# Patient Record
Sex: FEMALE | Race: WHITE | NOT HISPANIC OR LATINO | Employment: OTHER | URBAN - NONMETROPOLITAN AREA
[De-identification: names, ages, dates, MRNs, and addresses within clinical notes are randomized per-mention and may not be internally consistent; named-entity substitution may affect disease eponyms.]

---

## 2020-01-07 NOTE — PROGRESS NOTES
Patient: Alex Moyer    YOB: 1950    Date: 01/10/2020    Primary Care Provider: Provider, No Known    Chief Complaint   Patient presents with   • Mass     Posterior neck mass        SUBJECTIVE:    History of present illness:  I saw the patient in the office today for the evaluation and treatment of a posterior neck mass.  The patient states that she first noticed a small marble size knot on the back of her neck about 20 years ago.  She states that this has increased in size over the years.  She has recently started noticing some stinging and discomfort when moving her neck or raising her arms.  Mass is now become considerably larger, greater than 5 cm and mobile.    The following portions of the patient's history were reviewed and updated as appropriate: allergies, current medications, past family history, past medical history, past social history, past surgical history and problem list.      Review of Systems   Constitutional: Negative for chills, fever and unexpected weight change.   HENT: Negative for hearing loss, trouble swallowing and voice change.    Eyes: Negative for visual disturbance.   Respiratory: Negative for apnea, cough, chest tightness, shortness of breath and wheezing.    Cardiovascular: Negative for chest pain, palpitations and leg swelling.   Gastrointestinal: Negative for abdominal distention, abdominal pain, anal bleeding, blood in stool, constipation, diarrhea, nausea, rectal pain and vomiting.   Endocrine: Negative for cold intolerance and heat intolerance.   Genitourinary: Negative for difficulty urinating, dysuria and flank pain.   Musculoskeletal: Negative for back pain and gait problem.   Skin: Negative for color change, rash and wound.   Neurological: Negative for dizziness, syncope, speech difficulty, weakness, light-headedness, numbness and headaches.   Hematological: Negative for adenopathy. Does not bruise/bleed easily.   Psychiatric/Behavioral: Negative for  "confusion. The patient is not nervous/anxious.        Allergies:  No Known Allergies    Medications:    Current Outpatient Medications:   •  hydroCHLOROthiazide (HYDRODIURIL) 50 MG tablet, Take 50 mg by mouth Daily., Disp: , Rfl:     History:  History reviewed. No pertinent past medical history.    Past Surgical History:   Procedure Laterality Date   • NO PAST SURGERIES         Family History   Problem Relation Age of Onset   • No Known Problems Mother    • No Known Problems Father    • Hypertension Sister        Social History     Tobacco Use   • Smoking status: Never Smoker   • Smokeless tobacco: Never Used   Substance Use Topics   • Alcohol use: No     Frequency: Never   • Drug use: Defer        OBJECTIVE:    Vital Signs:   Vitals:    01/10/20 1446   BP: 110/70   Pulse: 62   Temp: 98.6 °F (37 °C)   TempSrc: Temporal   SpO2: 97%   Weight: 84.4 kg (186 lb)   Height: 163.8 cm (64.5\")       Physical Exam:   General Appearance:    Alert, cooperative, in no acute distress   Head:    Normocephalic, without obvious abnormality, atraumatic   Eyes:            Lids and lashes normal, conjunctivae and sclerae normal, no   icterus, no pallor, corneas clear, PERRLA   Ears:    Ears appear intact with no abnormalities noted   Throat:   No oral lesions, no thrush, oral mucosa moist   Neck:   No adenopathy, supple, trachea midline, no thyromegaly, no   carotid bruit, no JVD.  5 to 6 cm lipomatous mass left posterior neck.  Mobile and well-circumscribed.   Lungs:     Clear to auscultation,respirations regular, even and                  unlabored    Heart:    Regular rhythm and normal rate, normal S1 and S2, no            murmur, no gallop, no rub, no click   Chest Wall:    No abnormalities observed   Abdomen:     Normal bowel sounds, no masses, no organomegaly, soft        non-tender, non-distended, no guarding, no rebound                tenderness   Extremities:   Moves all extremities well, no edema, no cyanosis, no             " redness   Pulses:   Pulses palpable and equal bilaterally   Skin:   No bleeding, bruising or rash   Lymph nodes:   No palpable adenopathy   Neurologic:   Cranial nerves 2 - 12 grossly intact, sensation intact, DTR       present and equal bilaterally     Results Review:   I reviewed the patient's new clinical results.    Review of Systems was reviewed and confirmed as accurate as documented by the MA.    ASSESSMENT/PLAN:    1. Lipoma of neck        Patient scheduled for excision of neck mass.  Risk of bleeding infection recurrence discussed and patient agreeable.    I discussed the patients findings and my recommendations with patient        Electronically signed by Marie Watts MD  01/10/20      .    Portions of this note have been scribed for Marie Watts MD by Katie Rodriguez 1/10/2020  3:14 PM

## 2020-01-10 ENCOUNTER — OFFICE VISIT (OUTPATIENT)
Dept: SURGERY | Facility: CLINIC | Age: 70
End: 2020-01-10

## 2020-01-10 VITALS
TEMPERATURE: 98.6 F | WEIGHT: 186 LBS | BODY MASS INDEX: 30.99 KG/M2 | SYSTOLIC BLOOD PRESSURE: 110 MMHG | DIASTOLIC BLOOD PRESSURE: 70 MMHG | HEIGHT: 65 IN | HEART RATE: 62 BPM | OXYGEN SATURATION: 97 %

## 2020-01-10 DIAGNOSIS — D17.0 LIPOMA OF NECK: Primary | ICD-10-CM

## 2020-01-10 PROCEDURE — 99203 OFFICE O/P NEW LOW 30 MIN: CPT | Performed by: SURGERY

## 2020-01-10 RX ORDER — HYDROCHLOROTHIAZIDE 50 MG/1
50 TABLET ORAL DAILY
COMMUNITY

## 2020-01-20 ENCOUNTER — PROCEDURE VISIT (OUTPATIENT)
Dept: SURGERY | Facility: CLINIC | Age: 70
End: 2020-01-20

## 2020-01-20 VITALS
OXYGEN SATURATION: 97 % | WEIGHT: 186 LBS | SYSTOLIC BLOOD PRESSURE: 124 MMHG | HEART RATE: 69 BPM | BODY MASS INDEX: 43.05 KG/M2 | TEMPERATURE: 97.8 F | HEIGHT: 55 IN | DIASTOLIC BLOOD PRESSURE: 78 MMHG

## 2020-01-20 DIAGNOSIS — D17.1 LIPOMA OF TORSO: Primary | ICD-10-CM

## 2020-01-20 PROCEDURE — 99024 POSTOP FOLLOW-UP VISIT: CPT | Performed by: SURGERY

## 2020-01-20 PROCEDURE — 21931 EXC BACK LES SC 3 CM/>: CPT | Performed by: SURGERY

## 2020-01-20 NOTE — PROGRESS NOTES
Location: Left shoulder    Procedure: Excision 5 cm lipoma left shoulder with layered closure      I recommend excision. Procedure and the risks and benefits were explained including bleeding and infection. The patient understands these and wishes to proceed.     The patient was brought to the procedure room. Consent and time out were performed. The area was prepped and draped in the usual fashion. 1% lidocaine with epinephrine was infused locally. An ellyptical incision was made around the lesion. Full thickness excision was performed. The lesion size was 5 cm. The wound was closed in layers with interrupted simple vicryl and Nylon for the skin. Wound closure size was 5 cm. There were no complications and the patient tolerated the procedure well. Hemostasis was well controlled with pressure and there was minimal blood loss. Wound instructions were given.

## 2020-01-22 NOTE — PROGRESS NOTES
Patient: Alex Moyer    YOB: 1950    Date: 01/27/2020    Primary Care Provider: Provider, No Known    No chief complaint on file.      History of present illness:  I saw the patient in the office today as a followup from their recent lesion excision, the pathology report did show ***.  They state that they have done well and are having no problems.    Vital Signs:  There were no vitals filed for this visit.    Physical Exam:   General Appearance:    Alert, cooperative, in no acute distress, wound clean dry without infection   Abdomen:     no masses, no organomegaly, soft non-tender, non-distended, no guarding, wounds are well healed   Chest:      Clear to ausculation       Assessment / Plan:    No diagnosis found.    I did discuss the situation with the patient today in the office and they have done well from their recent lesion excision, I don't think that the patient needs any further intervention and I need to see them back only if they have further problems. Pathology report was reviewed with the patient in the office.    Electronically signed by Betty Villavicencio MA  01/22/20

## 2020-01-27 ENCOUNTER — OFFICE VISIT (OUTPATIENT)
Dept: SURGERY | Facility: CLINIC | Age: 70
End: 2020-01-27

## 2020-01-27 VITALS
WEIGHT: 186.2 LBS | HEART RATE: 80 BPM | OXYGEN SATURATION: 98 % | DIASTOLIC BLOOD PRESSURE: 70 MMHG | BODY MASS INDEX: 43.09 KG/M2 | SYSTOLIC BLOOD PRESSURE: 120 MMHG | TEMPERATURE: 97.5 F | HEIGHT: 55 IN

## 2020-01-27 DIAGNOSIS — Z48.89 POSTOPERATIVE VISIT: Primary | ICD-10-CM

## 2020-01-27 PROCEDURE — 99024 POSTOP FOLLOW-UP VISIT: CPT | Performed by: SURGERY

## 2020-01-27 NOTE — PROGRESS NOTES
Patient: Alex Moyer    YOB: 1950    Date: 01/27/2020    Primary Care Provider: Provider, No Known    Chief Complaint   Patient presents with   • Post-op     excision       History of present illness:  I saw the patient in the office today as a followup from their recent sebaceous cyst excision @ posterior neck.  They state that they have done well and are having no problems.    Vital Signs:  There were no vitals filed for this visit.    Physical Exam:   General Appearance:    Alert, cooperative, in no acute distress, wound clean dry without infection   Abdomen:     no masses, no organomegaly, soft non-tender, non-distended, no guarding, wounds are well healed   Chest:      Clear to ausculation       Assessment / Plan:    1. Postoperative visit        I did discuss the situation with the patient today in the office and they have done well from their recent lesion excision, I don't think that the patient needs any further intervention and I need to see them back only if they have further problems. Pathology report was reviewed with the patient in the office.    Electronically signed by Marie Watts MD  01/28/20      Portions of this note has been scribed for Marie Watts MD by Nan Ashley. 1/28/2020  2:44 PM

## 2020-01-27 NOTE — PROGRESS NOTES
Patient comes in today to have suture removal from a recent excision of sebaceous cyst @ posterior neck. The sutures were removed and there was no redness or drainage from the sight. The patient has no complaints.